# Patient Record
Sex: FEMALE | Race: BLACK OR AFRICAN AMERICAN | Employment: FULL TIME | URBAN - METROPOLITAN AREA
[De-identification: names, ages, dates, MRNs, and addresses within clinical notes are randomized per-mention and may not be internally consistent; named-entity substitution may affect disease eponyms.]

---

## 2019-09-14 ENCOUNTER — APPOINTMENT (OUTPATIENT)
Dept: CT IMAGING | Age: 34
End: 2019-09-14
Payer: COMMERCIAL

## 2019-09-14 ENCOUNTER — HOSPITAL ENCOUNTER (EMERGENCY)
Age: 34
Discharge: HOME | End: 2019-09-14
Attending: EMERGENCY MEDICINE
Payer: COMMERCIAL

## 2019-09-14 VITALS
WEIGHT: 172.2 LBS | SYSTOLIC BLOOD PRESSURE: 109 MMHG | HEIGHT: 69 IN | BODY MASS INDEX: 25.51 KG/M2 | TEMPERATURE: 98.4 F | RESPIRATION RATE: 14 BRPM | HEART RATE: 90 BPM | OXYGEN SATURATION: 100 % | DIASTOLIC BLOOD PRESSURE: 83 MMHG

## 2019-09-14 DIAGNOSIS — R09.1 PLEURISY: Primary | ICD-10-CM

## 2019-09-14 LAB
ANION GAP SERPL CALCULATED.3IONS-SCNC: 12 MMOL/L (ref 3–16)
BASOPHILS ABSOLUTE: 0.1 K/UL (ref 0–0.2)
BASOPHILS RELATIVE PERCENT: 0.7 %
BUN BLDV-MCNC: 9 MG/DL (ref 7–20)
CALCIUM SERPL-MCNC: 8.9 MG/DL (ref 8.3–10.6)
CHLORIDE BLD-SCNC: 107 MMOL/L (ref 99–110)
CO2: 21 MMOL/L (ref 21–32)
CREAT SERPL-MCNC: 0.7 MG/DL (ref 0.6–1.1)
D DIMER: 517 NG/ML DDU (ref 0–229)
EOSINOPHILS ABSOLUTE: 0 K/UL (ref 0–0.6)
EOSINOPHILS RELATIVE PERCENT: 0.5 %
GFR AFRICAN AMERICAN: >60
GFR NON-AFRICAN AMERICAN: >60
GLUCOSE BLD-MCNC: 83 MG/DL (ref 70–99)
HCG(URINE) PREGNANCY TEST: NEGATIVE
HCT VFR BLD CALC: 39.6 % (ref 36–48)
HEMOGLOBIN: 13.4 G/DL (ref 12–16)
LYMPHOCYTES ABSOLUTE: 2.4 K/UL (ref 1–5.1)
LYMPHOCYTES RELATIVE PERCENT: 30.1 %
MCH RBC QN AUTO: 29.8 PG (ref 26–34)
MCHC RBC AUTO-ENTMCNC: 33.9 G/DL (ref 31–36)
MCV RBC AUTO: 87.7 FL (ref 80–100)
MONOCYTES ABSOLUTE: 0.6 K/UL (ref 0–1.3)
MONOCYTES RELATIVE PERCENT: 6.9 %
NEUTROPHILS ABSOLUTE: 5 K/UL (ref 1.7–7.7)
NEUTROPHILS RELATIVE PERCENT: 61.8 %
PDW BLD-RTO: 12.9 % (ref 12.4–15.4)
PLATELET # BLD: 271 K/UL (ref 135–450)
PMV BLD AUTO: 8.3 FL (ref 5–10.5)
POTASSIUM SERPL-SCNC: 4.1 MMOL/L (ref 3.5–5.1)
RBC # BLD: 4.51 M/UL (ref 4–5.2)
SODIUM BLD-SCNC: 140 MMOL/L (ref 136–145)
TROPONIN: <0.01 NG/ML
WBC # BLD: 8.1 K/UL (ref 4–11)

## 2019-09-14 PROCEDURE — 6370000000 HC RX 637 (ALT 250 FOR IP): Performed by: EMERGENCY MEDICINE

## 2019-09-14 PROCEDURE — 96374 THER/PROPH/DIAG INJ IV PUSH: CPT

## 2019-09-14 PROCEDURE — 85025 COMPLETE CBC W/AUTO DIFF WBC: CPT

## 2019-09-14 PROCEDURE — 85379 FIBRIN DEGRADATION QUANT: CPT

## 2019-09-14 PROCEDURE — 80048 BASIC METABOLIC PNL TOTAL CA: CPT

## 2019-09-14 PROCEDURE — 84703 CHORIONIC GONADOTROPIN ASSAY: CPT

## 2019-09-14 PROCEDURE — 99284 EMERGENCY DEPT VISIT MOD MDM: CPT

## 2019-09-14 PROCEDURE — 71260 CT THORAX DX C+: CPT

## 2019-09-14 PROCEDURE — 6360000002 HC RX W HCPCS: Performed by: EMERGENCY MEDICINE

## 2019-09-14 PROCEDURE — 6360000004 HC RX CONTRAST MEDICATION: Performed by: EMERGENCY MEDICINE

## 2019-09-14 PROCEDURE — 84484 ASSAY OF TROPONIN QUANT: CPT

## 2019-09-14 PROCEDURE — 93005 ELECTROCARDIOGRAM TRACING: CPT | Performed by: EMERGENCY MEDICINE

## 2019-09-14 RX ORDER — PREDNISONE 20 MG/1
40 TABLET ORAL DAILY
Qty: 10 TABLET | Refills: 0 | Status: SHIPPED | OUTPATIENT
Start: 2019-09-14 | End: 2019-09-19

## 2019-09-14 RX ORDER — IBUPROFEN 400 MG/1
400 TABLET ORAL ONCE
Status: COMPLETED | OUTPATIENT
Start: 2019-09-14 | End: 2019-09-14

## 2019-09-14 RX ORDER — KETOROLAC TROMETHAMINE 15 MG/ML
15 INJECTION, SOLUTION INTRAMUSCULAR; INTRAVENOUS ONCE
Status: COMPLETED | OUTPATIENT
Start: 2019-09-14 | End: 2019-09-14

## 2019-09-14 RX ADMIN — IOPAMIDOL 75 ML: 755 INJECTION, SOLUTION INTRAVENOUS at 15:09

## 2019-09-14 RX ADMIN — IBUPROFEN 400 MG: 400 TABLET, FILM COATED ORAL at 16:11

## 2019-09-14 RX ADMIN — KETOROLAC TROMETHAMINE 15 MG: 15 INJECTION, SOLUTION INTRAMUSCULAR; INTRAVENOUS at 16:11

## 2019-09-14 ASSESSMENT — ENCOUNTER SYMPTOMS
CONSTIPATION: 0
ABDOMINAL PAIN: 0
NAUSEA: 0
DIARRHEA: 0
COUGH: 0
EYES NEGATIVE: 1
SHORTNESS OF BREATH: 1

## 2019-09-14 ASSESSMENT — PAIN SCALES - GENERAL
PAINLEVEL_OUTOF10: 7
PAINLEVEL_OUTOF10: 5

## 2019-09-14 ASSESSMENT — PAIN DESCRIPTION - DESCRIPTORS: DESCRIPTORS: SHARP

## 2019-09-14 ASSESSMENT — PAIN DESCRIPTION - FREQUENCY: FREQUENCY: CONTINUOUS

## 2019-09-14 ASSESSMENT — PAIN DESCRIPTION - PAIN TYPE: TYPE: ACUTE PAIN

## 2019-09-14 NOTE — ED PROVIDER NOTES
Basophils Absolute 0.1 0.0 - 0.2 K/uL   Basic Metabolic Panel   Result Value Ref Range    Sodium 140 136 - 145 mmol/L    Potassium 4.1 3.5 - 5.1 mmol/L    Chloride 107 99 - 110 mmol/L    CO2 21 21 - 32 mmol/L    Anion Gap 12 3 - 16    Glucose 83 70 - 99 mg/dL    BUN 9 7 - 20 mg/dL    CREATININE 0.7 0.6 - 1.1 mg/dL    GFR Non-African American >60 >60    GFR African American >60 >60    Calcium 8.9 8.3 - 10.6 mg/dL   D-Dimer, Quantitative   Result Value Ref Range    D-Dimer, Quant 517 (H) 0 - 229 ng/mL DDU   Troponin   Result Value Ref Range    Troponin <0.01 <0.01 ng/mL   Pregnancy, Urine   Result Value Ref Range    HCG(Urine) Pregnancy Test Negative Detects HCG level >20 MIU/mL       ED BEDSIDE ULTRASOUND:      RECENT VITALS:  BP: 109/83, Temp: 98.4 °F (36.9 °C), Pulse: 90, Resp: 14, SpO2: 100 %     Procedures         ED Course     Nursing Notes, Past Medical Hx, Past Surgical Hx, Social Hx, Allergies, and Family Hx were reviewed. The patient was given the following medications:  Orders Placed This Encounter   Medications    iopamidol (ISOVUE-370) 76 % injection 75 mL    predniSONE (DELTASONE) 20 MG tablet     Sig: Take 2 tablets by mouth daily for 5 days     Dispense:  10 tablet     Refill:  0    ibuprofen (ADVIL;MOTRIN) tablet 400 mg    ketorolac (TORADOL) injection 15 mg       CONSULTS:  None    MEDICAL DECISION MAKING / ASSESSMENT / Basilia Cannon is a 30 y.o. female presents with onset of pleuritic chest discomfort today was seen and evaluated and her work-up was unremarkable except a positive d-dimer. Therefore CT to rule out PE was performed and was negative except for mild inflammatory airway process without evidence of pneumonia. Her lungs are otherwise were clear and she has had no reported recent cough or chest congestion so she will be asked to follow-up with her doctor if she develops cough and/or if she does not feel improved with supportive measures.   She will be placed on prednisone that may be able to also help with the mild inflammatory process seen on CT  Critical Care:      Clinical Impression     1.  Pleurisy        Disposition     PATIENT REFERRED TO:  [address] [place] [address] Road  [phone]    Schedule an appointment as soon as possible for a visit   this week      DISCHARGE MEDICATIONS:  Discharge Medication List as of 9/14/2019  4:17 PM      START taking these medications    Details   predniSONE (DELTASONE) 20 MG tablet Take 2 tablets by mouth daily for 5 days, Disp-10 tablet, R-0Print             DISPOSITION Decision To Discharge 09/14/2019 03:41:29 PM          [facility], DO  09/14/19 7660

## 2019-09-16 LAB
EKG ATRIAL RATE: 106 BPM
EKG DIAGNOSIS: NORMAL
EKG P AXIS: 62 DEGREES
EKG P-R INTERVAL: 152 MS
EKG Q-T INTERVAL: 308 MS
EKG QRS DURATION: 76 MS
EKG QTC CALCULATION (BAZETT): 409 MS
EKG R AXIS: 2 DEGREES
EKG T AXIS: 13 DEGREES
EKG VENTRICULAR RATE: 106 BPM

## 2019-09-16 PROCEDURE — 93010 ELECTROCARDIOGRAM REPORT: CPT | Performed by: INTERNAL MEDICINE

## 2019-11-18 ENCOUNTER — HOSPITAL ENCOUNTER (EMERGENCY)
Age: 34
Discharge: HOME | End: 2019-11-18
Attending: EMERGENCY MEDICINE
Payer: COMMERCIAL

## 2019-11-18 ENCOUNTER — APPOINTMENT (OUTPATIENT)
Dept: GENERAL RADIOLOGY | Age: 34
End: 2019-11-18
Payer: COMMERCIAL

## 2019-11-18 VITALS
RESPIRATION RATE: 18 BRPM | HEART RATE: 90 BPM | OXYGEN SATURATION: 100 % | TEMPERATURE: 98.2 F | SYSTOLIC BLOOD PRESSURE: 122 MMHG | DIASTOLIC BLOOD PRESSURE: 92 MMHG

## 2019-11-18 DIAGNOSIS — I47.1 PAROXYSMAL SUPRAVENTRICULAR TACHYCARDIA (HCC): Primary | ICD-10-CM

## 2019-11-18 DIAGNOSIS — E83.42 HYPOMAGNESEMIA: ICD-10-CM

## 2019-11-18 LAB
A/G RATIO: 1.2 (ref 1.1–2.2)
ALBUMIN SERPL-MCNC: 4 G/DL (ref 3.4–5)
ALP BLD-CCNC: 48 U/L (ref 40–129)
ALT SERPL-CCNC: 11 U/L (ref 10–40)
ANION GAP SERPL CALCULATED.3IONS-SCNC: 17 MMOL/L (ref 3–16)
AST SERPL-CCNC: 14 U/L (ref 15–37)
BASOPHILS ABSOLUTE: 0.1 K/UL (ref 0–0.2)
BASOPHILS RELATIVE PERCENT: 1 %
BILIRUB SERPL-MCNC: 0.6 MG/DL (ref 0–1)
BUN BLDV-MCNC: 10 MG/DL (ref 7–20)
CALCIUM SERPL-MCNC: 9.2 MG/DL (ref 8.3–10.6)
CHLORIDE BLD-SCNC: 104 MMOL/L (ref 99–110)
CO2: 19 MMOL/L (ref 21–32)
CREAT SERPL-MCNC: 0.8 MG/DL (ref 0.6–1.1)
EKG ATRIAL RATE: 39 BPM
EKG ATRIAL RATE: 98 BPM
EKG DIAGNOSIS: NORMAL
EKG DIAGNOSIS: NORMAL
EKG P AXIS: 61 DEGREES
EKG P-R INTERVAL: 148 MS
EKG Q-T INTERVAL: 296 MS
EKG Q-T INTERVAL: 328 MS
EKG QRS DURATION: 70 MS
EKG QRS DURATION: 82 MS
EKG QTC CALCULATION (BAZETT): 418 MS
EKG QTC CALCULATION (BAZETT): 459 MS
EKG R AXIS: 0 DEGREES
EKG R AXIS: 6 DEGREES
EKG T AXIS: -5 DEGREES
EKG T AXIS: 15 DEGREES
EKG VENTRICULAR RATE: 145 BPM
EKG VENTRICULAR RATE: 98 BPM
EOSINOPHILS ABSOLUTE: 0.1 K/UL (ref 0–0.6)
EOSINOPHILS RELATIVE PERCENT: 0.7 %
GFR AFRICAN AMERICAN: >60
GFR NON-AFRICAN AMERICAN: >60
GLOBULIN: 3.3 G/DL
GLUCOSE BLD-MCNC: 92 MG/DL (ref 70–99)
HCT VFR BLD CALC: 39.9 % (ref 36–48)
HEMOGLOBIN: 13.6 G/DL (ref 12–16)
LYMPHOCYTES ABSOLUTE: 3.5 K/UL (ref 1–5.1)
LYMPHOCYTES RELATIVE PERCENT: 38.4 %
MAGNESIUM: 1.7 MG/DL (ref 1.8–2.4)
MCH RBC QN AUTO: 29.7 PG (ref 26–34)
MCHC RBC AUTO-ENTMCNC: 34 G/DL (ref 31–36)
MCV RBC AUTO: 87.4 FL (ref 80–100)
MONOCYTES ABSOLUTE: 0.8 K/UL (ref 0–1.3)
MONOCYTES RELATIVE PERCENT: 8.8 %
NEUTROPHILS ABSOLUTE: 4.7 K/UL (ref 1.7–7.7)
NEUTROPHILS RELATIVE PERCENT: 51.1 %
PDW BLD-RTO: 12.4 % (ref 12.4–15.4)
PLATELET # BLD: 330 K/UL (ref 135–450)
PMV BLD AUTO: 8.1 FL (ref 5–10.5)
POTASSIUM SERPL-SCNC: 3.9 MMOL/L (ref 3.5–5.1)
RBC # BLD: 4.57 M/UL (ref 4–5.2)
SODIUM BLD-SCNC: 140 MMOL/L (ref 136–145)
TOTAL PROTEIN: 7.3 G/DL (ref 6.4–8.2)
TROPONIN: <0.01 NG/ML
WBC # BLD: 9.1 K/UL (ref 4–11)

## 2019-11-18 PROCEDURE — 71045 X-RAY EXAM CHEST 1 VIEW: CPT

## 2019-11-18 PROCEDURE — 83735 ASSAY OF MAGNESIUM: CPT

## 2019-11-18 PROCEDURE — 96374 THER/PROPH/DIAG INJ IV PUSH: CPT

## 2019-11-18 PROCEDURE — 85025 COMPLETE CBC W/AUTO DIFF WBC: CPT

## 2019-11-18 PROCEDURE — 93010 ELECTROCARDIOGRAM REPORT: CPT | Performed by: INTERNAL MEDICINE

## 2019-11-18 PROCEDURE — 99285 EMERGENCY DEPT VISIT HI MDM: CPT

## 2019-11-18 PROCEDURE — 80053 COMPREHEN METABOLIC PANEL: CPT

## 2019-11-18 PROCEDURE — 84484 ASSAY OF TROPONIN QUANT: CPT

## 2019-11-18 PROCEDURE — 96361 HYDRATE IV INFUSION ADD-ON: CPT

## 2019-11-18 PROCEDURE — 2580000003 HC RX 258: Performed by: EMERGENCY MEDICINE

## 2019-11-18 PROCEDURE — 93005 ELECTROCARDIOGRAM TRACING: CPT | Performed by: EMERGENCY MEDICINE

## 2019-11-18 PROCEDURE — 6360000002 HC RX W HCPCS

## 2019-11-18 RX ORDER — ADENOSINE 3 MG/ML
6 INJECTION, SOLUTION INTRAVENOUS ONCE
Status: COMPLETED | OUTPATIENT
Start: 2019-11-18 | End: 2019-11-18

## 2019-11-18 RX ORDER — ADENOSINE 3 MG/ML
INJECTION, SOLUTION INTRAVENOUS
Status: COMPLETED
Start: 2019-11-18 | End: 2019-11-18

## 2019-11-18 RX ORDER — MAGNESIUM CHLORIDE 64 MG
2 TABLET, DELAYED RELEASE (ENTERIC COATED) ORAL
Qty: 10 TABLET | Refills: 0 | Status: SHIPPED | OUTPATIENT
Start: 2019-11-18 | End: 2020-01-14

## 2019-11-18 RX ORDER — 0.9 % SODIUM CHLORIDE 0.9 %
1000 INTRAVENOUS SOLUTION INTRAVENOUS ONCE
Status: COMPLETED | OUTPATIENT
Start: 2019-11-18 | End: 2019-11-18

## 2019-11-18 RX ADMIN — ADENOSINE 6 MG: 3 INJECTION, SOLUTION INTRAVENOUS at 15:17

## 2019-11-18 RX ADMIN — SODIUM CHLORIDE 1000 ML: 9 INJECTION, SOLUTION INTRAVENOUS at 15:21

## 2019-11-18 ASSESSMENT — PAIN SCALES - GENERAL
PAINLEVEL_OUTOF10: 7
PAINLEVEL_OUTOF10: 4

## 2019-11-18 ASSESSMENT — PAIN DESCRIPTION - PAIN TYPE: TYPE: ACUTE PAIN

## 2019-11-18 ASSESSMENT — PAIN DESCRIPTION - ORIENTATION: ORIENTATION: LEFT

## 2019-11-18 ASSESSMENT — PAIN DESCRIPTION - LOCATION: LOCATION: CHEST

## 2019-11-18 ASSESSMENT — PAIN DESCRIPTION - DESCRIPTORS: DESCRIPTORS: THROBBING

## 2020-01-14 ENCOUNTER — APPOINTMENT (OUTPATIENT)
Dept: GENERAL RADIOLOGY | Age: 35
End: 2020-01-14
Payer: COMMERCIAL

## 2020-01-14 ENCOUNTER — HOSPITAL ENCOUNTER (EMERGENCY)
Age: 35
Discharge: HOME | End: 2020-01-14
Attending: EMERGENCY MEDICINE
Payer: COMMERCIAL

## 2020-01-14 VITALS
RESPIRATION RATE: 16 BRPM | OXYGEN SATURATION: 98 % | HEART RATE: 106 BPM | SYSTOLIC BLOOD PRESSURE: 121 MMHG | TEMPERATURE: 98.8 F | BODY MASS INDEX: 24.88 KG/M2 | WEIGHT: 168 LBS | DIASTOLIC BLOOD PRESSURE: 89 MMHG | HEIGHT: 69 IN

## 2020-01-14 PROCEDURE — 71046 X-RAY EXAM CHEST 2 VIEWS: CPT

## 2020-01-14 PROCEDURE — 6370000000 HC RX 637 (ALT 250 FOR IP): Performed by: EMERGENCY MEDICINE

## 2020-01-14 PROCEDURE — 99283 EMERGENCY DEPT VISIT LOW MDM: CPT

## 2020-01-14 PROCEDURE — 94640 AIRWAY INHALATION TREATMENT: CPT

## 2020-01-14 PROCEDURE — 94761 N-INVAS EAR/PLS OXIMETRY MLT: CPT

## 2020-01-14 RX ORDER — ALBUTEROL SULFATE 90 UG/1
2 AEROSOL, METERED RESPIRATORY (INHALATION) ONCE
Status: COMPLETED | OUTPATIENT
Start: 2020-01-14 | End: 2020-01-14

## 2020-01-14 RX ADMIN — ALBUTEROL SULFATE 2 PUFF: 90 AEROSOL, METERED RESPIRATORY (INHALATION) at 22:39

## 2020-01-14 ASSESSMENT — ENCOUNTER SYMPTOMS
COUGH: 1
CONSTIPATION: 0
DIARRHEA: 0
NAUSEA: 0
SHORTNESS OF BREATH: 0
ABDOMINAL PAIN: 0
EYES NEGATIVE: 1

## 2020-01-15 NOTE — ED PROVIDER NOTES
[facility]          ATTENDING PHYSICIAN NOTE       Date of evaluation: 1/14/2020    Chief Complaint     Cough      History of Present Illness     Amina Latif is a 30 y.o. female who presents with a persisting cough. Patient's had this over the past 3 to 4 weeks. She states she sometimes brings up some whitish phlegm. She denies chest pain. At one point she was on cough medicine and prednisone. She denies any chest pain. Review of Systems     Review of Systems   Constitutional: Negative for chills and fever. HENT: Positive for congestion. Eyes: Negative. Respiratory: Positive for cough. Negative for shortness of breath. Cardiovascular: Negative for chest pain and palpitations. Gastrointestinal: Negative for abdominal pain, constipation, diarrhea and nausea. Genitourinary: Negative for dysuria, frequency, urgency, vaginal bleeding and vaginal discharge. Musculoskeletal: Negative. Skin: Negative. Neurological: Negative. Hematological: Negative for adenopathy. Psychiatric/Behavioral: Negative. All other systems reviewed and are negative. Past Medical, Surgical, Family, and Social History     She has no past medical history on file. She has no past surgical history on file. Her family history is not on file. She reports that she has never smoked. She does not have any smokeless tobacco history on file. She reports current alcohol use. She reports that she does not use drugs. Medications     Discharge Medication List as of 1/14/2020 10:46 PM      CONTINUE these medications which have NOT CHANGED    Details   NONFORMULARY Indications: bcp Historical Med             Allergies     She is allergic to amoxicillin; erythromycin; and levofloxacin. Physical Exam     INITIAL VITALS: BP: 124/87, Temp: 98.8 °F (37.1 °C), Pulse: 106, Resp: 20, SpO2: 97 %   Physical Exam  Vitals signs and nursing note reviewed.    Constitutional: General: She is not in acute distress. Appearance: She is well-developed. Comments:   /87   Pulse 106   Temp 98.8 °F (37.1 °C) (Oral)   Resp 20   Ht 5' 9\" (1.753 m)   Wt 168 lb (76.2 kg)   SpO2 97%   BMI 24.81 kg/m²        HENT:      Head: Normocephalic and atraumatic. Right Ear: External ear normal.      Left Ear: External ear normal.      Nose: Nose normal.   Eyes:      General:         Right eye: No discharge. Left eye: No discharge. Neck:      Musculoskeletal: Normal range of motion and neck supple. Pulmonary:      Comments: Patient has an occasional wheeze with rhonchi. Musculoskeletal: Normal range of motion. Skin:     General: Skin is warm and dry. Neurological:      Mental Status: She is alert and oriented to person, place, and time. Psychiatric:         Behavior: Behavior normal.         Diagnostic Results       RADIOLOGY:  XR CHEST STANDARD (2 VW)   Final Result      1. No acute disease. LABS:   No results found for this visit on 01/14/20. RECENT VITALS:  BP: 121/89,Temp: 98.8 °F (37.1 °C), Pulse: 106, Resp: 16, SpO2: 98 %     Procedures         ED Course     Nursing Notes, Past Medical Hx, Past Surgical Hx, Social Hx,Allergies, and Family Hx were reviewed. patient was given the following medications:  Orders Placed This Encounter   Medications    albuterol sulfate  (90 Base) MCG/ACT inhaler 2 puff    DISCONTD: OPTICHAMBER ADVANTAGE MISC 1 Device       CONSULTS:  None    MEDICAL DECISIONMAKING / ASSESSMENT / Emiliana Kim is a 30 y.o. female presents with persistent cough. On exam she has an occasional wheeze. CXR unremarkable. Clinical Impression     1.  Cough        Disposition     PATIENT REFERRED TO:  [address] [place] [address] Road  [phone]    Schedule an appointment as soon as possible for a visit in 2 days        DISCHARGE MEDICATIONS:  Discharge Medication List as of 1/14/2020 10:46 PM          DISPOSITION  Patient discharged home on an albuterol inhaler and a spacer.        Balwinder Graham MD  01/17/20 0656

## 2020-01-15 NOTE — ED NOTES
Dc inst reviewed with pt and verb understanding.   Dc'd ambulatory to private auto by self     Kayli Cox RN  01/14/20 2721

## 2023-02-06 ENCOUNTER — HOSPITAL ENCOUNTER (EMERGENCY)
Age: 38
Discharge: HOME | End: 2023-02-06
Payer: COMMERCIAL

## 2023-02-06 VITALS
TEMPERATURE: 98.2 F | DIASTOLIC BLOOD PRESSURE: 62 MMHG | WEIGHT: 172.9 LBS | OXYGEN SATURATION: 100 % | SYSTOLIC BLOOD PRESSURE: 127 MMHG | BODY MASS INDEX: 25.61 KG/M2 | RESPIRATION RATE: 14 BRPM | HEIGHT: 69 IN | HEART RATE: 87 BPM

## 2023-02-06 DIAGNOSIS — R51.9 ACUTE INTRACTABLE HEADACHE, UNSPECIFIED HEADACHE TYPE: ICD-10-CM

## 2023-02-06 DIAGNOSIS — R05.1 ACUTE COUGH: ICD-10-CM

## 2023-02-06 DIAGNOSIS — B34.9 VIRAL ILLNESS: Primary | ICD-10-CM

## 2023-02-06 DIAGNOSIS — R52 BODY ACHES: ICD-10-CM

## 2023-02-06 DIAGNOSIS — J02.9 ACUTE PHARYNGITIS, UNSPECIFIED ETIOLOGY: ICD-10-CM

## 2023-02-06 LAB
BILIRUBIN URINE: NEGATIVE
BLOOD, URINE: NEGATIVE
CLARITY: CLEAR
COLOR: YELLOW
GLUCOSE URINE: NEGATIVE MG/DL
HCG(URINE) PREGNANCY TEST: NEGATIVE
KETONES, URINE: NEGATIVE MG/DL
LEUKOCYTE ESTERASE, URINE: NEGATIVE
MICROSCOPIC EXAMINATION: NORMAL
NITRITE, URINE: NEGATIVE
PH UA: 6 (ref 5–8)
PROTEIN UA: NEGATIVE MG/DL
RAPID INFLUENZA  B AGN: NEGATIVE
RAPID INFLUENZA A AGN: NEGATIVE
SARS-COV-2, NAAT: NOT DETECTED
SPECIFIC GRAVITY UA: 1.01 (ref 1–1.03)
URINE REFLEX TO CULTURE: NORMAL
URINE TYPE: NORMAL
UROBILINOGEN, URINE: 0.2 E.U./DL

## 2023-02-06 PROCEDURE — 84703 CHORIONIC GONADOTROPIN ASSAY: CPT

## 2023-02-06 PROCEDURE — 87635 SARS-COV-2 COVID-19 AMP PRB: CPT

## 2023-02-06 PROCEDURE — 87804 INFLUENZA ASSAY W/OPTIC: CPT

## 2023-02-06 PROCEDURE — 6370000000 HC RX 637 (ALT 250 FOR IP): Performed by: PHYSICIAN ASSISTANT

## 2023-02-06 PROCEDURE — 99283 EMERGENCY DEPT VISIT LOW MDM: CPT

## 2023-02-06 PROCEDURE — 81003 URINALYSIS AUTO W/O SCOPE: CPT

## 2023-02-06 RX ORDER — MONTELUKAST SODIUM 10 MG/1
TABLET ORAL
COMMUNITY
Start: 2022-12-27

## 2023-02-06 RX ORDER — ESCITALOPRAM OXALATE 10 MG/1
TABLET ORAL
COMMUNITY
Start: 2022-12-27

## 2023-02-06 RX ORDER — IBUPROFEN 600 MG/1
600 TABLET ORAL ONCE
Status: COMPLETED | OUTPATIENT
Start: 2023-02-06 | End: 2023-02-06

## 2023-02-06 RX ORDER — ACETAMINOPHEN 325 MG/1
650 TABLET ORAL ONCE
Status: COMPLETED | OUTPATIENT
Start: 2023-02-06 | End: 2023-02-06

## 2023-02-06 RX ADMIN — IBUPROFEN 600 MG: 600 TABLET, FILM COATED ORAL at 20:31

## 2023-02-06 RX ADMIN — ACETAMINOPHEN 650 MG: 325 TABLET ORAL at 20:32

## 2023-02-06 ASSESSMENT — PAIN - FUNCTIONAL ASSESSMENT
PAIN_FUNCTIONAL_ASSESSMENT: 0-10
PAIN_FUNCTIONAL_ASSESSMENT: ACTIVITIES ARE NOT PREVENTED
PAIN_FUNCTIONAL_ASSESSMENT: ACTIVITIES ARE NOT PREVENTED
PAIN_FUNCTIONAL_ASSESSMENT: 0-10

## 2023-02-06 ASSESSMENT — PAIN DESCRIPTION - ONSET: ONSET: SUDDEN

## 2023-02-06 ASSESSMENT — PAIN SCALES - GENERAL
PAINLEVEL_OUTOF10: 9
PAINLEVEL_OUTOF10: 4

## 2023-02-06 ASSESSMENT — PAIN DESCRIPTION - ORIENTATION
ORIENTATION: UPPER
ORIENTATION: UPPER;ANTERIOR

## 2023-02-06 ASSESSMENT — PAIN DESCRIPTION - PAIN TYPE
TYPE: ACUTE PAIN
TYPE: ACUTE PAIN

## 2023-02-06 ASSESSMENT — PAIN DESCRIPTION - FREQUENCY
FREQUENCY: INTERMITTENT
FREQUENCY: INTERMITTENT

## 2023-02-06 ASSESSMENT — PAIN DESCRIPTION - LOCATION
LOCATION: HEAD
LOCATION: HEAD

## 2023-02-06 ASSESSMENT — PAIN DESCRIPTION - DESCRIPTORS
DESCRIPTORS: POUNDING
DESCRIPTORS: PRESSURE

## 2023-02-06 NOTE — Clinical Note
Negro Briones was seen and treated in our emergency department on 2/6/2023. She may return to work on 02/13/2023. If you have any questions or concerns, please don't hesitate to call.       FABI Reaves

## 2023-02-07 NOTE — DISCHARGE INSTRUCTIONS
Rest, drink plenty of water, take over-the-counter Tylenol and/or ibuprofen for body aches and fever.

## 2023-02-07 NOTE — ED PROVIDER NOTES
[address]        Pt Name: Michele Taylor  MRN: [id number]  [place] 1985  Date of evaluation: 2/6/2023  Provider: Junie Gosselin, PA-C  PCP: Hugo Herron  ED Attending: Sofie Franklin MD      JON. I have evaluated this patient. My supervising physician was available for consultation. CHIEF COMPLAINT:     Chief Complaint   Patient presents with    Pharyngitis     Started this morning    Chills     Started this evening. Generalized Body Aches     Started this evening. Headache       HISTORY OF PRESENT ILLNESS:      History provided by the patient. No limitations. Michele Taylor is a 30 y.o. female who arrives to the ED by private vehicle. Patient states yesterday evening she started having some mild chills and body aches. Upon waking up today she felt worse and also had somewhat of a cough and sore throat. She has developed a headache as the evening has wore on. She reports feeling feverish. Temp 100 °F on arrival.  Patient took Tylenol this afternoon but has taken nothing since. She has not been around anyone known to have COVID or flu. Additionally, the patient reports some mild urinary frequency. Nursing Notes were reviewed     REVIEW OF SYSTEMS:     Review of Systems  Positives and pertinent negatives as per HPI. PAST MEDICAL HISTORY:     Past Medical History:   Diagnosis Date    Anxiety        SURGICAL HISTORY:    History reviewed. No pertinent surgical history.     CURRENT MEDICATIONS:       Previous Medications    ESCITALOPRAM (LEXAPRO) 10 MG TABLET    TAKE 1 TABLET(10 MG) BY MOUTH DAILY    LEVONORGESTREL (MIRENA) IUD 52 MG    1 Intra Uterine Device by IntraUTERine route continuous    MONTELUKAST (SINGULAIR) 10 MG TABLET        NONFORMULARY    Indications: bcp        ALLERGIES:    Amoxicillin, Erythromycin, and Levofloxacin    FAMILY HISTORY:     @FAMX    SOCIAL HISTORY:       Social History     Socioeconomic History Marital status:      Spouse name: None    Number of children: None    Years of education: None    Highest education level: None   Tobacco Use    Smoking status: Never   Substance and Sexual Activity    Alcohol use: Yes     Comment: occ    Drug use: No       SCREENINGS:    [place] Coma Scale  Eye Opening: Spontaneous  Best Verbal Response: Oriented  Best Motor Response: Obeys commands  Pinky Coma Scale Score: 15      CIWA Assessment  BP: (!) 140/85  Heart Rate: 96        PHYSICAL EXAM:       ED Triage Vitals [02/06/23 2000]   BP Temp Temp Source Heart Rate Resp SpO2 Height Weight   (!) 140/85 100 °F (37.8 °C) Oral 96 12 100 % 5' 9\" (1.753 m) 172 lb 14.4 oz (78.4 kg)       Physical Exam    CONSTITUTIONAL: Awake and alert. Cooperative. Well-developed. Well-nourished. No acute distress. HENT: Normocephalic. Atraumatic. External ears normal, without discharge. No nasal discharge. Oropharynx clear. Mucous membranes moist.  No swelling or erythema of the posterior pharynx. EYES: Conjunctiva non-injected. No scleral icterus. PERRL. NECK: Supple. Normal ROM. CARDIOVASCULAR: RRR. Intact distal pulses. PULMONARY/CHEST WALL: Effort normal. No tachypnea. Lungs clear to ausculation. ABDOMEN: Soft. Nondistended. No tenderness to palpate. SKIN: Warm and dry. No rash. NEUROLOGICAL: Alert and oriented x 3. GCS 15.    PSYCHIATRIC: Normal affect      DIAGNOSTICRESULTS:     LABS:    Results for orders placed or performed during the hospital encounter of 02/06/23   COVID-19, Rapid    Specimen: Nasopharyngeal Swab   Result Value Ref Range    SARS-CoV-2, NAAT Not Detected Not Detected   Rapid influenza A/B antigens    Specimen: Nasopharyngeal   Result Value Ref Range    Rapid Influenza A Ag Negative Negative    Rapid Influenza B Ag Negative Negative   Urinalysis with Reflex to Culture    Specimen: Urine   Result Value Ref Range    Color, UA Yellow Straw/Yellow    Clarity, UA Clear Clear    Glucose, Ur Negative Negative mg/dL    Bilirubin Urine Negative Negative    Ketones, Urine Negative Negative mg/dL    Specific Gravity, UA 1.010 1.005 - 1.030    Blood, Urine Negative Negative    pH, UA 6.0 5.0 - 8.0    Protein, UA Negative Negative mg/dL    Urobilinogen, Urine 0.2 <2.0 E.U./dL    Nitrite, Urine Negative Negative    Leukocyte Esterase, Urine Negative Negative    Microscopic Examination Not Indicated     Urine Type NotGiven     Urine Reflex to Culture Not Indicated    Pregnancy, Urine   Result Value Ref Range    HCG(Urine) Pregnancy Test Negative Detects HCG level >20 MIU/mL         RADIOLOGY:    None          PROCEDURES:   N/A    CRITICAL CARE TIME:       None      CONSULTS:  None      EMERGENCY DEPARTMENT COURSE and DIFFERENTIAL DIAGNOSIS/MDM:   Vitals:    Vitals:    02/06/23 2000   BP: (!) 140/85   Pulse: 96   Resp: 12   Temp: 100 °F (37.8 °C)   TempSrc: Oral   SpO2: 100%   Weight: 172 lb 14.4 oz (78.4 kg)   Height: 5' 9\" (1.753 m)       Patient was given the following medications:  Medications   acetaminophen (TYLENOL) tablet 650 mg (650 mg Oral Given 2/6/23 2032)   ibuprofen (ADVIL;MOTRIN) tablet 600 mg (600 mg Oral Given 2/6/23 2031)          CC/HPI Summary, DDx, ED course, and Reassessment: Patient here with what sounds like a viral illness. Suspected possible COVID, flu. Differential additionally included pneumonia, bronchitis, strep and UTI. Rapid flu and COVID tests are done in the ED and are both negative. Her urinalysis and urine hCG are normal/negative. She was treated with oral Tylenol and ibuprofen for her symptoms. Again the symptoms of started within the last 24 hours or less. I do not see any indication for further testing at this time.   Other than slight temp of 100 °F, vital signs are normal.    Consults: None    Discussion with other professionals (optional): None    Social determinants (optional): None    Chronic conditions: None    Records reviewed: NA    Disposition Considerations: Patient here with what sounds like a viral illness. Her rapid flu and COVID test are negative. UA negative. hCG negative. She is given medicine for symptoms and vital signs are unremarkable. She is young, otherwise healthy and at this point do not see an indication for further testing such as labs or chest x-ray. I feel outpatient management is appropriate. No indication for hospitalization. Patient agreeable with plan of care. Will provide work note for her and encourage over-the-counter medication for symptom control at home as well as rest and plenty of fluids. FINAL IMPRESSION:      1. Viral illness    2. Acute intractable headache, unspecified headache type    3. Acute cough    4. Acute pharyngitis, unspecified etiology    5.  Body aches          DISPOSITION/PLAN:   DISPOSITION Decision To Discharge      PATIENT REFERRED TO:  [address] [place] [address]  [phone]            DISCHARGE MEDICATIONS:  New Prescriptions    No medications on file                  (Please note thatportions of this note were completed with a voice recognition program.  Efforts were made to edit the dictations, but occasionally words are mis-transcribed.)    Fariha Nguyen PA-C (electronicallysigned)              Dior Perea, [address]  02/06/23 0870

## 2023-02-07 NOTE — ED NOTES
Patient prepared for and ready to be discharged. Patient discharged at this time in no acute distress after verbalizing understanding of discharge instructions. Patient left after receiving After Visit Summary instructions.         Bianca Larson RN  02/06/23 8860

## 2023-06-03 ENCOUNTER — HOSPITAL ENCOUNTER (EMERGENCY)
Age: 38
Discharge: HOME | End: 2023-06-03
Attending: EMERGENCY MEDICINE
Payer: COMMERCIAL

## 2023-06-03 ENCOUNTER — APPOINTMENT (OUTPATIENT)
Dept: GENERAL RADIOLOGY | Age: 38
End: 2023-06-03
Payer: COMMERCIAL

## 2023-06-03 VITALS
WEIGHT: 177.5 LBS | DIASTOLIC BLOOD PRESSURE: 83 MMHG | RESPIRATION RATE: 16 BRPM | OXYGEN SATURATION: 100 % | SYSTOLIC BLOOD PRESSURE: 108 MMHG | TEMPERATURE: 98.1 F | HEIGHT: 69 IN | HEART RATE: 86 BPM | BODY MASS INDEX: 26.29 KG/M2

## 2023-06-03 DIAGNOSIS — R79.89 ELEVATED BRAIN NATRIURETIC PEPTIDE (BNP) LEVEL: ICD-10-CM

## 2023-06-03 DIAGNOSIS — R07.9 CHEST PAIN, UNSPECIFIED TYPE: ICD-10-CM

## 2023-06-03 DIAGNOSIS — I47.1 PAROXYSMAL SUPRAVENTRICULAR TACHYCARDIA (HCC): Primary | ICD-10-CM

## 2023-06-03 LAB
ALBUMIN SERPL-MCNC: 4.1 G/DL (ref 3.4–5)
ALBUMIN/GLOB SERPL: 1.5 {RATIO} (ref 1.1–2.2)
ALP SERPL-CCNC: 58 U/L (ref 40–129)
ALT SERPL-CCNC: 9 U/L (ref 10–40)
ANION GAP SERPL CALCULATED.3IONS-SCNC: 9 MMOL/L (ref 3–16)
AST SERPL-CCNC: 17 U/L (ref 15–37)
BASOPHILS # BLD: 0 K/UL (ref 0–0.2)
BASOPHILS NFR BLD: 0.4 %
BILIRUB SERPL-MCNC: 0.4 MG/DL (ref 0–1)
BUN SERPL-MCNC: 9 MG/DL (ref 7–20)
CALCIUM SERPL-MCNC: 8.9 MG/DL (ref 8.3–10.6)
CHLORIDE SERPL-SCNC: 107 MMOL/L (ref 99–110)
CO2 SERPL-SCNC: 23 MMOL/L (ref 21–32)
CREAT SERPL-MCNC: 0.8 MG/DL (ref 0.6–1.1)
D DIMER: <0.27 UG/ML FEU (ref 0–0.6)
DEPRECATED RDW RBC AUTO: 12.4 % (ref 12.4–15.4)
EOSINOPHIL # BLD: 0.1 K/UL (ref 0–0.6)
EOSINOPHIL NFR BLD: 1 %
GFR SERPLBLD CREATININE-BSD FMLA CKD-EPI: >60 ML/MIN/{1.73_M2}
GLUCOSE SERPL-MCNC: 99 MG/DL (ref 70–99)
HCG SERPL QL: NEGATIVE
HCT VFR BLD AUTO: 41.1 % (ref 36–48)
HGB BLD-MCNC: 13.9 G/DL (ref 12–16)
LYMPHOCYTES # BLD: 2.4 K/UL (ref 1–5.1)
LYMPHOCYTES NFR BLD: 37.5 %
MAGNESIUM SERPL-MCNC: 1.9 MG/DL (ref 1.8–2.4)
MCH RBC QN AUTO: 29.9 PG (ref 26–34)
MCHC RBC AUTO-ENTMCNC: 33.9 G/DL (ref 31–36)
MCV RBC AUTO: 88.4 FL (ref 80–100)
MONOCYTES # BLD: 0.6 K/UL (ref 0–1.3)
MONOCYTES NFR BLD: 9.7 %
NEUTROPHILS # BLD: 3.3 K/UL (ref 1.7–7.7)
NEUTROPHILS NFR BLD: 51.4 %
NT-PROBNP SERPL-MCNC: 530 PG/ML (ref 0–124)
PLATELET # BLD AUTO: 253 K/UL (ref 135–450)
PMV BLD AUTO: 8.4 FL (ref 5–10.5)
POTASSIUM SERPL-SCNC: 4.1 MMOL/L (ref 3.5–5.1)
PROT SERPL-MCNC: 6.9 G/DL (ref 6.4–8.2)
RBC # BLD AUTO: 4.65 M/UL (ref 4–5.2)
SODIUM SERPL-SCNC: 139 MMOL/L (ref 136–145)
TROPONIN, HIGH SENSITIVITY: 10 NG/L (ref 0–14)
TROPONIN, HIGH SENSITIVITY: 8 NG/L (ref 0–14)
WBC # BLD AUTO: 6.4 K/UL (ref 4–11)

## 2023-06-03 PROCEDURE — 71045 X-RAY EXAM CHEST 1 VIEW: CPT

## 2023-06-03 PROCEDURE — 6360000002 HC RX W HCPCS: Performed by: EMERGENCY MEDICINE

## 2023-06-03 PROCEDURE — 96361 HYDRATE IV INFUSION ADD-ON: CPT

## 2023-06-03 PROCEDURE — 83735 ASSAY OF MAGNESIUM: CPT

## 2023-06-03 PROCEDURE — 85379 FIBRIN DEGRADATION QUANT: CPT

## 2023-06-03 PROCEDURE — 84703 CHORIONIC GONADOTROPIN ASSAY: CPT

## 2023-06-03 PROCEDURE — 83880 ASSAY OF NATRIURETIC PEPTIDE: CPT

## 2023-06-03 PROCEDURE — 80053 COMPREHEN METABOLIC PANEL: CPT

## 2023-06-03 PROCEDURE — 99285 EMERGENCY DEPT VISIT HI MDM: CPT

## 2023-06-03 PROCEDURE — 2580000003 HC RX 258: Performed by: EMERGENCY MEDICINE

## 2023-06-03 PROCEDURE — 96365 THER/PROPH/DIAG IV INF INIT: CPT

## 2023-06-03 PROCEDURE — 84484 ASSAY OF TROPONIN QUANT: CPT

## 2023-06-03 PROCEDURE — 85025 COMPLETE CBC W/AUTO DIFF WBC: CPT

## 2023-06-03 PROCEDURE — 93005 ELECTROCARDIOGRAM TRACING: CPT | Performed by: EMERGENCY MEDICINE

## 2023-06-03 RX ORDER — 0.9 % SODIUM CHLORIDE 0.9 %
500 INTRAVENOUS SOLUTION INTRAVENOUS ONCE
Status: COMPLETED | OUTPATIENT
Start: 2023-06-03 | End: 2023-06-03

## 2023-06-03 RX ORDER — ADENOSINE 3 MG/ML
6 INJECTION, SOLUTION INTRAVENOUS ONCE
Status: DISCONTINUED | OUTPATIENT
Start: 2023-06-03 | End: 2023-06-03

## 2023-06-03 RX ORDER — MAGNESIUM SULFATE 1 G/100ML
1000 INJECTION INTRAVENOUS ONCE
Status: COMPLETED | OUTPATIENT
Start: 2023-06-03 | End: 2023-06-03

## 2023-06-03 RX ADMIN — SODIUM CHLORIDE 500 ML: 9 INJECTION, SOLUTION INTRAVENOUS at 09:34

## 2023-06-03 RX ADMIN — MAGNESIUM SULFATE HEPTAHYDRATE 1000 MG: 1 INJECTION, SOLUTION INTRAVENOUS at 10:10

## 2023-06-03 ASSESSMENT — ENCOUNTER SYMPTOMS
ABDOMINAL PAIN: 0
NAUSEA: 0
VOMITING: 0
CHEST TIGHTNESS: 1
COUGH: 0
BACK PAIN: 1
SHORTNESS OF BREATH: 1
DIARRHEA: 0

## 2023-06-03 ASSESSMENT — PAIN DESCRIPTION - LOCATION: LOCATION: CHEST

## 2023-06-03 ASSESSMENT — PAIN - FUNCTIONAL ASSESSMENT
PAIN_FUNCTIONAL_ASSESSMENT: 0-10
PAIN_FUNCTIONAL_ASSESSMENT: NONE - DENIES PAIN

## 2023-06-03 ASSESSMENT — PAIN DESCRIPTION - ORIENTATION: ORIENTATION: MID

## 2023-06-03 ASSESSMENT — PAIN DESCRIPTION - FREQUENCY: FREQUENCY: CONTINUOUS

## 2023-06-03 ASSESSMENT — PAIN SCALES - GENERAL
PAINLEVEL_OUTOF10: 6
PAINLEVEL_OUTOF10: 0

## 2023-06-03 ASSESSMENT — PAIN DESCRIPTION - PAIN TYPE: TYPE: ACUTE PAIN

## 2023-06-03 ASSESSMENT — PAIN DESCRIPTION - ONSET: ONSET: ON-GOING

## 2023-06-03 NOTE — DISCHARGE INSTRUCTIONS
Stay hydrated. Follow-up with cardiology over the next 1 to 2 weeks in regards to your supraventricular tachycardia. Call on Monday to make urgent appointment. See your primary doctor for any other concerns. Return to the emergency department for any return of significant elevated heart rate with chest pain, passing out, high fever, or any other concerns.

## 2023-06-03 NOTE — ED PROVIDER NOTES
[address] [address] ENCOUNTER        Pt Name: Mane Solis  MRN: [id number]  [place] 1985  Date of evaluation: 6/3/2023  Provider: Genesis Arenas MD  PCP: [address]       Chief Complaint   Patient presents with    Chest Pain     CP that started two days ago. States hx pleurisy. Tachycardia       HISTORY OFPRESENT ILLNESS   (Location/Symptom, Timing/Onset, Context/Setting, Quality, Duration, Modifying Factors,Severity)  Note limiting factors. Mane Solis is a 41 y.o. female presenting today due to concern for having a constant dull chest pain over the last 2 days and then checking her pulse yesterday and noticing it was in the 140s and since her pulse did not improve, she decided to come to the emergency department. She reports this feels like \"pleurisy\" but when I asked what that referred to she states it feels like whenever she was seen in 2019. Based on chart review, it does appear at that time she had SVT requiring adenosine and again she states this feels just like whenever that happened. She denies any leg swelling or history of blood clots. She does have Mirena but denies being on any estrogen. Last menstrual cycle was a couple of weeks ago. She does report feeling slightly lightheaded and short of breath related to the elevated pulse. No fevers or chills. No nausea or vomiting. No diarrhea. No reason to be dehydrated. No headache. No pain with deep breaths. No hemoptysis. No abdominal pain. Other than feeling a slight discomfort in her chest along with concern for of the palpitations, she has no other complaints at this time. REVIEW OF SYSTEMS    (2-9 systems for level 4, 10 or more for level 5)     Review of Systems   Constitutional:  Positive for fatigue. Negative for chills, diaphoresis and fever. Eyes:  Negative for visual disturbance.    Respiratory:  Positive for chest tightness and shortness

## 2023-06-04 LAB
EKG DIAGNOSIS: NORMAL
EKG DIAGNOSIS: NORMAL
EKG Q-T INTERVAL: 304 MS
EKG Q-T INTERVAL: 316 MS
EKG QRS DURATION: 74 MS
EKG QRS DURATION: 74 MS
EKG QTC CALCULATION (BAZETT): 421 MS
EKG QTC CALCULATION (BAZETT): 429 MS
EKG R AXIS: -15 DEGREES
EKG R AXIS: -18 DEGREES
EKG T AXIS: -5 DEGREES
EKG T AXIS: 2 DEGREES
EKG VENTRICULAR RATE: 107 BPM
EKG VENTRICULAR RATE: 120 BPM

## 2023-06-04 PROCEDURE — 93010 ELECTROCARDIOGRAM REPORT: CPT | Performed by: INTERNAL MEDICINE

## 2023-06-06 LAB
EKG ATRIAL RATE: 84 BPM
EKG DIAGNOSIS: NORMAL
EKG P AXIS: 50 DEGREES
EKG P-R INTERVAL: 168 MS
EKG Q-T INTERVAL: 350 MS
EKG QRS DURATION: 84 MS
EKG QTC CALCULATION (BAZETT): 413 MS
EKG R AXIS: -15 DEGREES
EKG T AXIS: 12 DEGREES
EKG VENTRICULAR RATE: 84 BPM

## 2023-06-06 PROCEDURE — 93010 ELECTROCARDIOGRAM REPORT: CPT | Performed by: INTERNAL MEDICINE

## 2023-06-22 ENCOUNTER — OFFICE VISIT (OUTPATIENT)
Dept: CARDIOLOGY CLINIC | Age: 38
End: 2023-06-22
Payer: COMMERCIAL

## 2023-06-22 VITALS
HEART RATE: 69 BPM | DIASTOLIC BLOOD PRESSURE: 72 MMHG | WEIGHT: 179.6 LBS | SYSTOLIC BLOOD PRESSURE: 104 MMHG | BODY MASS INDEX: 26.52 KG/M2

## 2023-06-22 DIAGNOSIS — I47.1 SVT (SUPRAVENTRICULAR TACHYCARDIA) (HCC): Primary | ICD-10-CM

## 2023-06-22 PROCEDURE — 93000 ELECTROCARDIOGRAM COMPLETE: CPT | Performed by: INTERNAL MEDICINE

## 2023-06-22 PROCEDURE — 99205 OFFICE O/P NEW HI 60 MIN: CPT | Performed by: INTERNAL MEDICINE

## 2023-06-22 NOTE — PATIENT INSTRUCTIONS
Electrophysiology Study (EPS) and Catheter Ablation    Date of Procedure:     Time of Arrival:     Cardiologist performing procedure: Dr. Suzette Kelly at [facility] through the main entrance. Check in at the Outpatient Diagnostic desk on the 1st floor. Do not eat or drink anything after midnight the night before the procedure. You may brush your teeth and rinse the morning of the procedure. Take all your other routine medications the morning of the procedure. Lab work is due within a week of the procedure. You do not need to be fasting for these labs. This can be done at the [address] [place] [address] at [place] [address]. [address] [facility]. Do not apply any lotion, powder, or deodorant the morning of the procedure. Please bring a list of your medications to the hospital with you. You must have someone available to drive you home. It is recommended that you do not drive for 48 hours after the procedure. You will need someone to stay with you at home the night of the procedure. If you are unable to make this appointment, please call [address] [facility], at [phone].

## 2023-07-26 ENCOUNTER — PREP FOR PROCEDURE (OUTPATIENT)
Dept: CARDIOLOGY CLINIC | Age: 38
End: 2023-07-26

## 2023-07-26 ENCOUNTER — TELEPHONE (OUTPATIENT)
Dept: CARDIOLOGY CLINIC | Age: 38
End: 2023-07-26

## 2023-07-26 DIAGNOSIS — I47.1 SVT (SUPRAVENTRICULAR TACHYCARDIA) (HCC): Primary | ICD-10-CM

## 2023-07-26 NOTE — TELEPHONE ENCOUNTER
Pt called back, ready to schedule SVT ablation, 8/21 at 1400. Reviewed instructions with pt, will post to [facility]. EP form completed.

## 2023-07-27 RX ORDER — SODIUM CHLORIDE 0.9 % (FLUSH) 0.9 %
5-40 SYRINGE (ML) INJECTION PRN
Status: CANCELLED | OUTPATIENT
Start: 2023-08-21

## 2023-07-27 RX ORDER — SODIUM CHLORIDE 9 MG/ML
INJECTION, SOLUTION INTRAVENOUS PRN
Status: CANCELLED | OUTPATIENT
Start: 2023-08-21

## 2023-07-27 RX ORDER — SODIUM CHLORIDE 0.9 % (FLUSH) 0.9 %
5-40 SYRINGE (ML) INJECTION EVERY 12 HOURS SCHEDULED
Status: CANCELLED | OUTPATIENT
Start: 2023-08-21

## 2023-08-03 ENCOUNTER — TELEPHONE (OUTPATIENT)
Dept: CARDIOLOGY CLINIC | Age: 38
End: 2023-08-03

## 2023-08-03 NOTE — TELEPHONE ENCOUNTER
LVM for patient to let her know we are moving her procedure time up slightly. I asked that she give me a call back to confirm she can make the earlier time. If I'm on the phone okay to just confirm the below and let me know.      Procedure- SVT Abl  Date: 8/21/2023  Arrival time: 10:00 am  (New time)   Procedure time: 12:00 pm  (New time)       Pending confirmation to let cath lab know

## 2023-08-07 ENCOUNTER — TELEPHONE (OUTPATIENT)
Dept: CARDIOLOGY CLINIC | Age: 38
End: 2023-08-07

## 2023-08-17 DIAGNOSIS — I47.10 SVT (SUPRAVENTRICULAR TACHYCARDIA): ICD-10-CM

## 2023-08-18 ENCOUNTER — PRE-PROCEDURE TELEPHONE (OUTPATIENT)
Dept: CARDIAC CATH/INVASIVE PROCEDURES | Age: 38
End: 2023-08-18

## 2023-08-18 LAB
ANION GAP SERPL CALCULATED.3IONS-SCNC: 14 MMOL/L (ref 3–16)
BUN SERPL-MCNC: 10 MG/DL (ref 7–20)
CALCIUM SERPL-MCNC: 9.1 MG/DL (ref 8.3–10.6)
CHLORIDE SERPL-SCNC: 101 MMOL/L (ref 99–110)
CO2 SERPL-SCNC: 21 MMOL/L (ref 21–32)
CREAT SERPL-MCNC: 0.8 MG/DL (ref 0.6–1.1)
DEPRECATED RDW RBC AUTO: 12.7 % (ref 12.4–15.4)
GFR SERPLBLD CREATININE-BSD FMLA CKD-EPI: >60 ML/MIN/{1.73_M2}
GLUCOSE SERPL-MCNC: 77 MG/DL (ref 70–99)
HCT VFR BLD AUTO: 38.8 % (ref 36–48)
HGB BLD-MCNC: 13.3 G/DL (ref 12–16)
INR PPP: 1.03 (ref 0.84–1.16)
MCH RBC QN AUTO: 30.2 PG (ref 26–34)
MCHC RBC AUTO-ENTMCNC: 34.3 G/DL (ref 31–36)
MCV RBC AUTO: 87.9 FL (ref 80–100)
PLATELET # BLD AUTO: 263 K/UL (ref 135–450)
PMV BLD AUTO: 8.4 FL (ref 5–10.5)
POTASSIUM SERPL-SCNC: 4.5 MMOL/L (ref 3.5–5.1)
PROTHROMBIN TIME: 13.5 SEC (ref 11.5–14.8)
RBC # BLD AUTO: 4.41 M/UL (ref 4–5.2)
SODIUM SERPL-SCNC: 136 MMOL/L (ref 136–145)
WBC # BLD AUTO: 6.6 K/UL (ref 4–11)

## 2023-08-18 NOTE — PROGRESS NOTES
Called patient about procedure. Told to be here at 1000 for procedure at 1200. Must be NPO after midnight but can take morning medication with sips of water. Patient is not on a blood thinner. Told to have a responsible adult with them to take them home and stay with them afterwards, if they do not get admitted to hospital. Also, to bring a current list of medications. No other questions or concerns.

## 2023-08-21 ENCOUNTER — HOSPITAL ENCOUNTER (OUTPATIENT)
Dept: CARDIAC CATH/INVASIVE PROCEDURES | Age: 38
Discharge: HOME | End: 2023-08-23
Payer: COMMERCIAL

## 2023-08-21 ENCOUNTER — ANESTHESIA EVENT (OUTPATIENT)
Dept: CARDIAC CATH/INVASIVE PROCEDURES | Age: 38
End: 2023-08-21

## 2023-08-21 ENCOUNTER — ANESTHESIA (OUTPATIENT)
Dept: CARDIAC CATH/INVASIVE PROCEDURES | Age: 38
End: 2023-08-21

## 2023-08-21 VITALS — TEMPERATURE: 97.3 F | HEIGHT: 69 IN | BODY MASS INDEX: 26.66 KG/M2 | WEIGHT: 180 LBS

## 2023-08-21 LAB
EKG ATRIAL RATE: 70 BPM
EKG DIAGNOSIS: NORMAL
EKG P AXIS: 71 DEGREES
EKG P-R INTERVAL: 158 MS
EKG Q-T INTERVAL: 376 MS
EKG QRS DURATION: 90 MS
EKG QTC CALCULATION (BAZETT): 406 MS
EKG R AXIS: 38 DEGREES
EKG T AXIS: 61 DEGREES
EKG VENTRICULAR RATE: 70 BPM
HCG UR QL: NEGATIVE

## 2023-08-21 PROCEDURE — C1730 CATH, EP, 19 OR FEW ELECT: HCPCS

## 2023-08-21 PROCEDURE — 2720000010 HC SURG SUPPLY STERILE

## 2023-08-21 PROCEDURE — 93005 ELECTROCARDIOGRAM TRACING: CPT | Performed by: INTERNAL MEDICINE

## 2023-08-21 PROCEDURE — 2500000003 HC RX 250 WO HCPCS: Performed by: NURSE ANESTHETIST, CERTIFIED REGISTERED

## 2023-08-21 PROCEDURE — 2709999900 HC NON-CHARGEABLE SUPPLY

## 2023-08-21 PROCEDURE — 3700000000 HC ANESTHESIA ATTENDED CARE

## 2023-08-21 PROCEDURE — 6360000002 HC RX W HCPCS

## 2023-08-21 PROCEDURE — C1766 INTRO/SHEATH,STRBLE,NON-PEEL: HCPCS

## 2023-08-21 PROCEDURE — 93623 PRGRMD STIMJ&PACG IV RX NFS: CPT

## 2023-08-21 PROCEDURE — C1760 CLOSURE DEV, VASC: HCPCS

## 2023-08-21 PROCEDURE — 84703 CHORIONIC GONADOTROPIN ASSAY: CPT

## 2023-08-21 PROCEDURE — 2500000003 HC RX 250 WO HCPCS

## 2023-08-21 PROCEDURE — C1894 INTRO/SHEATH, NON-LASER: HCPCS

## 2023-08-21 PROCEDURE — 93653 COMPRE EP EVAL TX SVT: CPT

## 2023-08-21 PROCEDURE — C1732 CATH, EP, DIAG/ABL, 3D/VECT: HCPCS

## 2023-08-21 PROCEDURE — 6360000002 HC RX W HCPCS: Performed by: NURSE ANESTHETIST, CERTIFIED REGISTERED

## 2023-08-21 PROCEDURE — 93622 COMP EP EVAL L VENTR PAC&REC: CPT

## 2023-08-21 PROCEDURE — 2580000003 HC RX 258: Performed by: NURSE ANESTHETIST, CERTIFIED REGISTERED

## 2023-08-21 PROCEDURE — 3700000001 HC ADD 15 MINUTES (ANESTHESIA)

## 2023-08-21 PROCEDURE — 93010 ELECTROCARDIOGRAM REPORT: CPT | Performed by: INTERNAL MEDICINE

## 2023-08-21 RX ORDER — ACETAMINOPHEN 325 MG/1
650 TABLET ORAL EVERY 4 HOURS PRN
Status: CANCELLED | OUTPATIENT
Start: 2023-08-21

## 2023-08-21 RX ORDER — FENTANYL CITRATE 50 UG/ML
INJECTION, SOLUTION INTRAMUSCULAR; INTRAVENOUS PRN
Status: DISCONTINUED | OUTPATIENT
Start: 2023-08-21 | End: 2023-08-21 | Stop reason: SDUPTHER

## 2023-08-21 RX ORDER — SODIUM CHLORIDE 0.9 % (FLUSH) 0.9 %
5-40 SYRINGE (ML) INJECTION PRN
Status: CANCELLED | OUTPATIENT
Start: 2023-08-21

## 2023-08-21 RX ORDER — PROPOFOL 10 MG/ML
INJECTION, EMULSION INTRAVENOUS CONTINUOUS PRN
Status: DISCONTINUED | OUTPATIENT
Start: 2023-08-21 | End: 2023-08-21 | Stop reason: SDUPTHER

## 2023-08-21 RX ORDER — SODIUM CHLORIDE 9 MG/ML
INJECTION, SOLUTION INTRAVENOUS PRN
Status: CANCELLED | OUTPATIENT
Start: 2023-08-21

## 2023-08-21 RX ORDER — SODIUM CHLORIDE, SODIUM LACTATE, POTASSIUM CHLORIDE, CALCIUM CHLORIDE 600; 310; 30; 20 MG/100ML; MG/100ML; MG/100ML; MG/100ML
INJECTION, SOLUTION INTRAVENOUS CONTINUOUS PRN
Status: DISCONTINUED | OUTPATIENT
Start: 2023-08-21 | End: 2023-08-21 | Stop reason: SDUPTHER

## 2023-08-21 RX ORDER — SODIUM CHLORIDE 0.9 % (FLUSH) 0.9 %
5-40 SYRINGE (ML) INJECTION PRN
Status: DISCONTINUED | OUTPATIENT
Start: 2023-08-21 | End: 2023-08-24 | Stop reason: HOSPADM

## 2023-08-21 RX ORDER — SODIUM CHLORIDE 9 MG/ML
INJECTION, SOLUTION INTRAVENOUS PRN
Status: DISCONTINUED | OUTPATIENT
Start: 2023-08-21 | End: 2023-08-24 | Stop reason: HOSPADM

## 2023-08-21 RX ORDER — LIDOCAINE HYDROCHLORIDE 20 MG/ML
INJECTION, SOLUTION EPIDURAL; INFILTRATION; INTRACAUDAL; PERINEURAL PRN
Status: DISCONTINUED | OUTPATIENT
Start: 2023-08-21 | End: 2023-08-21 | Stop reason: SDUPTHER

## 2023-08-21 RX ORDER — SODIUM CHLORIDE 0.9 % (FLUSH) 0.9 %
5-40 SYRINGE (ML) INJECTION EVERY 12 HOURS SCHEDULED
Status: DISCONTINUED | OUTPATIENT
Start: 2023-08-21 | End: 2023-08-24 | Stop reason: HOSPADM

## 2023-08-21 RX ORDER — SODIUM CHLORIDE 0.9 % (FLUSH) 0.9 %
5-40 SYRINGE (ML) INJECTION EVERY 12 HOURS SCHEDULED
Status: CANCELLED | OUTPATIENT
Start: 2023-08-21

## 2023-08-21 RX ORDER — HEPARIN SODIUM 1000 [USP'U]/ML
INJECTION, SOLUTION INTRAVENOUS; SUBCUTANEOUS PRN
Status: DISCONTINUED | OUTPATIENT
Start: 2023-08-21 | End: 2023-08-21 | Stop reason: SDUPTHER

## 2023-08-21 RX ORDER — PROPOFOL 10 MG/ML
INJECTION, EMULSION INTRAVENOUS PRN
Status: DISCONTINUED | OUTPATIENT
Start: 2023-08-21 | End: 2023-08-21 | Stop reason: SDUPTHER

## 2023-08-21 RX ADMIN — SODIUM CHLORIDE, SODIUM LACTATE, POTASSIUM CHLORIDE, AND CALCIUM CHLORIDE: .6; .31; .03; .02 INJECTION, SOLUTION INTRAVENOUS at 12:00

## 2023-08-21 RX ADMIN — PROPOFOL 150 MG: 10 INJECTION, EMULSION INTRAVENOUS at 12:04

## 2023-08-21 RX ADMIN — PROPOFOL 50 MG: 10 INJECTION, EMULSION INTRAVENOUS at 12:10

## 2023-08-21 RX ADMIN — FENTANYL CITRATE 50 MCG: 50 INJECTION, SOLUTION INTRAMUSCULAR; INTRAVENOUS at 13:50

## 2023-08-21 RX ADMIN — HEPARIN SODIUM 2000 UNITS: 1000 INJECTION INTRAVENOUS; SUBCUTANEOUS at 13:15

## 2023-08-21 RX ADMIN — HEPARIN SODIUM 5000 UNITS: 1000 INJECTION INTRAVENOUS; SUBCUTANEOUS at 12:17

## 2023-08-21 RX ADMIN — ISOPROTERENOL HYDROCHLORIDE 2 MCG/MIN: 0.2 INJECTION, SOLUTION INTRAMUSCULAR; INTRAVENOUS at 12:36

## 2023-08-21 RX ADMIN — FENTANYL CITRATE 50 MCG: 50 INJECTION, SOLUTION INTRAMUSCULAR; INTRAVENOUS at 12:12

## 2023-08-21 RX ADMIN — PROPOFOL 100 MCG/KG/MIN: 10 INJECTION, EMULSION INTRAVENOUS at 12:04

## 2023-08-21 RX ADMIN — LIDOCAINE HYDROCHLORIDE 50 MG: 20 INJECTION, SOLUTION EPIDURAL; INFILTRATION; INTRACAUDAL; PERINEURAL at 12:04

## 2023-08-21 NOTE — PROCEDURES
[address] [facility]     Electrophysiology Procedure Note       Date of Procedure: 8/21/2023  Patient's Name: Jan Walton  YOB: 1985   Medical Record Number: [id number]  Referring Physician: Karla att. providers found  Procedure Performed by: Marina Quiroz MD    Procedure performed:  Comprehensive electrophysiological study with attempted induction of arrhythmia at baseline. Attempted induction of arrhythmia after IV drug infusion. Three-dimensional electroanatomic mapping of the right atrium  Left atrial recording and mapping via coronary sinus   Radiofrequency ablation of SVT, AV alfonso re-entry. Anesthesia: Local and Monitored Anesthesia Care  An independent trained observer pushed medications at my direction. We monitored the patient's level of consciousness and vital signs/physiologic status throughout the procedure duration  EBL <20 cc    Indications for procedure:     Jan Walton 41 y.o. female  with PMH of documented symptomatic SVT. Patient has had palpitation dyspnea, chest pain associated with documented SVT by EKG. Details of Procedure: The risks, benefits and alternatives of the ablation procedure were discussed with the patient. The risks including, but not limited to, the risks of bleeding, infection, radiation exposure, injury to vascular, cardiac and surrounding structures (including pneumothorax), stroke, cardiac perforation, tamponade, need for emergent open heart surgery, need for pacemaker implantation, myocardial infarction and death were discussed in detail. The patient opted to proceed with the ablation. Written informed consent was signed and placed in the chart. The patient was brought to the electrophysiology lab in a fasting nonsedated state. Pre-sedation evaluation and airway assessment was completed. IV sedation was provided with IV Versed, Fentanyl.  The patient was monitored continuously with ECG, pulse oximetry, blood pressure monitoring, and direct

## 2023-08-21 NOTE — DISCHARGE INSTRUCTIONS
[address] [facility]  Electrophysiology  Dr. Mendel Maiers [facility]  Donis Mcburney [address], RN  [phone]    Cardiac Catheter Ablation  Discharge Instructions      WHAT YOU SHOULD KNOW:   Your heart has a special electrical system built into it that controls your heart rhythm. Sometimes there is a problem with this electrical system in the heart muscle. This problem may cause an arrhythmia ([id number]), or abnormal heart rhythm. If medicine does not correct the problem, or if you do not wish to take medicines long-term, you may need a cardiac ablation (ab-LAY-shun). An ablation may also be called a catheter ablation, or a radiofrequency ablation. An ablation procedure is usually done at the same time as an electrophysiology study. This test is used to [employer]" the electrical pathways in your heart that control your heart rhythm. This test helps your doctor find the exact spot where the ablation needs to be done. During an ablation, energy is sent through a special catheter to the area of your heart that has the electrical problem. This energy causes a tiny area of the heart muscle to scar, stopping the electrical problem and allowing your heart to beat regularly. Ask your caregiver for more information about your heart problem, and tests and treatments that may be done for it. AFTER YOU LEAVE:     Home care: Activity: After your ablation, rest for one to two days. If the ablation catheter was put in your groin, avoid using stairs for a few days. When you must use stairs, step up with the leg that was not used for the ablation. Straighten this leg to move the other leg up to the next step without putting stress on it.         __X__FOR THE NEXT 24 HOURS PLEASE HOLD PRESSURE TO YOUR GROIN WHEN YOU COUGH, SNEEZE OR CHANGE POSITIONS.        __X__PLEASE DO NOT LIFT GREATER THAN 10LBS FOR 1 WEEK OR BEAR DOWN. Bathing and catheter site care:        You may need to keep the

## 2023-08-21 NOTE — ANESTHESIA POSTPROCEDURE EVALUATION
Department of Anesthesiology  Postprocedure Note    Patient: Carlos Sanders  MRN: [id number]  YOB: 1985  Date of evaluation: 8/21/2023      Procedure Summary     Date: 08/21/23 Room / Location: [facility] Cath Lab    Anesthesia Start: 1200 Anesthesia Stop: 4965    Procedure: ABLATION Diagnosis:     Scheduled Providers: Lizabeth Jaramillo DO; Chiquita Pyle, APRN - CRNA Responsible Provider: Lizabeth Jaramillo DO    Anesthesia Type: MAC ASA Status: 2          Anesthesia Type: No value filed.     Yi Phase I:      Yi Phase II:        Anesthesia Post Evaluation    Patient location during evaluation: PACU  Patient participation: complete - patient participated  Level of consciousness: awake  Pain score: 0  Nausea & Vomiting: no nausea and no vomiting  Complications: no  Cardiovascular status: blood pressure returned to baseline  Respiratory status: acceptable  Hydration status: euvolemic  Pain management: adequate

## 2023-09-01 PROBLEM — I47.10 SVT (SUPRAVENTRICULAR TACHYCARDIA): Status: ACTIVE | Noted: 2023-09-01

## 2023-09-18 NOTE — PROGRESS NOTES
[facility]   Electrophysiology  Office Visit  Date: 9/27/2023    Chief Complaint   Patient presents with    Palpitations    Tachycardia       Cardiac HX: Alon Henderson is a 41 y.o. woman with a h/o SVT, ED (11/18/2019) w/ SVT requiring adenosine, ED visit (6/3/2023) for CP, tachycardia, noted to be in accelerated junctional, converted to NSR with IVF and IV Mg supplementation, s/p RFA of SVT/AVNRT (8/21/2023, Dr. Deyanira Martinez). Interval History/HPI: Patient is here for follow-up for SVT, accelerated junctional rhythm and palpitations. Patient originally gone to the emergency room in November 2019 with complaints of anxiety and tachycardia. She was found to be in SVT and was given adenosine and converted to normal sinus rhythm. She then presented to the emergency room on 6/3/2023 with complaints of chest pain, tachycardia and was noted to be in an accelerated junctional rhythm with a heart rate of 120 bpm.  She did convert to normal sinus rhythm with IV fluids and IV magnesium supplementation. She then underwent an RFA of SVT and AVNRT on 8/21/2023. Today she presents in NSR - HR 72. She states that she felt some palpitations for the first couple days after the ablation however the symptoms are gone. She is currently not feeling any heart racing, palpitations or irregular heartbeats. She states her right groin is healed. Her blood pressure is controlled in the office today. She denies any chest pain, shortness of breath, PND, orthopnea or lower extremity edema. Home medications:   Current Outpatient Medications on File Prior to Visit   Medication Sig Dispense Refill    escitalopram (LEXAPRO) 10 MG tablet TAKE 1 TABLET(10 MG) BY MOUTH DAILY      montelukast (SINGULAIR) 10 MG tablet       levonorgestrel (MIRENA) IUD 52 mg 1 Intra Uterine Device by IntraUTERine route continuous      NONFORMULARY Indications: bcp        No current facility-administered medications on file prior to visit.        Past

## 2023-09-27 ENCOUNTER — OFFICE VISIT (OUTPATIENT)
Dept: CARDIOLOGY CLINIC | Age: 38
End: 2023-09-27
Payer: COMMERCIAL

## 2023-09-27 VITALS
DIASTOLIC BLOOD PRESSURE: 60 MMHG | WEIGHT: 182 LBS | HEART RATE: 72 BPM | SYSTOLIC BLOOD PRESSURE: 108 MMHG | BODY MASS INDEX: 26.88 KG/M2

## 2023-09-27 DIAGNOSIS — I47.10 SVT (SUPRAVENTRICULAR TACHYCARDIA): Primary | ICD-10-CM

## 2023-09-27 DIAGNOSIS — I47.19 AVNRT (AV NODAL RE-ENTRY TACHYCARDIA): ICD-10-CM

## 2023-09-27 DIAGNOSIS — R00.2 PALPITATIONS: ICD-10-CM

## 2023-09-27 PROCEDURE — 93000 ELECTROCARDIOGRAM COMPLETE: CPT | Performed by: NURSE PRACTITIONER

## 2023-09-27 PROCEDURE — 99214 OFFICE O/P EST MOD 30 MIN: CPT | Performed by: NURSE PRACTITIONER

## 2023-10-19 ENCOUNTER — TELEPHONE (OUTPATIENT)
Dept: CARDIOLOGY CLINIC | Age: 38
End: 2023-10-19

## 2023-10-19 NOTE — TELEPHONE ENCOUNTER
Patient called stating that she has been experiencing chest tightness off and on for the past three days.  She would like to know if this is a side effect of the ablation she had on 8/21      [id number]

## 2023-10-20 NOTE — TELEPHONE ENCOUNTER
Can't be the side of Ablation. Call and get more history - how long pain lasts, which part of chest, any relieving factors? Recommend ECG.

## 2024-03-19 NOTE — PROGRESS NOTES
[facility]   Cardiac Electrophysiology Consultation   Date: 4/1/2024  Reason for Consultation:   Consult Requesting Physician: No att. providers found     Chief Complaint:   Chief Complaint   Patient presents with    6 Month Follow-Up      HPI: Kandy Ramirez is a 39 y.o. female with PMH significant for anxiety and SVT with ED visit (11/18/19) requiring adenosine for conversion to SR, thought to be triggered by low Mg (1.7). On 6/3/23, she went to ED for CP and tachycardia, troponins negative, noted to be in accelerated junctional, rate 120, converted to SR with IVF and IV Mg supplementation (1.9). CP resolved once she converted to SR. S/p RFA of SVT/AVNRT (8/21/23)    Interval History:   Today, she is here for 6 mo f/u, presenting in SR. Nbo recurrence of SVT since her ablation. 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       Past Medical History:   Diagnosis Date    Anxiety     Paroxysmal SVT (supraventricular tachycardia) (HCC)     Pleurisy         No past surgical history on file.    Allergies:  Allergies   Allergen Reactions    Amoxicillin     Erythromycin     Levofloxacin Other (See Comments)     Nerve pain, joint and muscle pain, swelling in feet and hands       Medication:   Prior to Admission medications    Medication Sig Start Date End Date Taking? Authorizing Provider   escitalopram (LEXAPRO) 10 MG tablet TAKE 1 TABLET(10 MG) BY MOUTH DAILY 12/27/22  Yes Phil Roe MD   montelukast (SINGULAIR) 10 MG tablet  12/27/22  Yes Phil Roe MD   levonorgestrel (MIRENA) IUD 52 mg 1 Intra Uterine Device by IntraUTERine route continuous   Yes Phil Roe MD   NONFORMULARY Indications: bcp    Yes Phil Roe MD       Social History:   reports that she has never smoked. She does not have any smokeless tobacco history on file. She reports that she does not

## 2024-04-01 ENCOUNTER — OFFICE VISIT (OUTPATIENT)
Dept: CARDIOLOGY CLINIC | Age: 39
End: 2024-04-01
Payer: COMMERCIAL

## 2024-04-01 VITALS
HEART RATE: 75 BPM | DIASTOLIC BLOOD PRESSURE: 80 MMHG | WEIGHT: 191 LBS | SYSTOLIC BLOOD PRESSURE: 122 MMHG | BODY MASS INDEX: 28.21 KG/M2

## 2024-04-01 DIAGNOSIS — I47.10 SVT (SUPRAVENTRICULAR TACHYCARDIA) (HCC): Primary | ICD-10-CM

## 2024-04-01 DIAGNOSIS — I47.19 AVNRT (AV NODAL RE-ENTRY TACHYCARDIA) (HCC): ICD-10-CM

## 2024-04-01 PROCEDURE — 99213 OFFICE O/P EST LOW 20 MIN: CPT | Performed by: INTERNAL MEDICINE

## 2024-04-01 PROCEDURE — 93000 ELECTROCARDIOGRAM COMPLETE: CPT | Performed by: INTERNAL MEDICINE
